# Patient Record
Sex: FEMALE | Race: WHITE
[De-identification: names, ages, dates, MRNs, and addresses within clinical notes are randomized per-mention and may not be internally consistent; named-entity substitution may affect disease eponyms.]

---

## 2019-01-19 ENCOUNTER — HOSPITAL ENCOUNTER (EMERGENCY)
Dept: HOSPITAL 14 - H.ER | Age: 64
Discharge: HOME | End: 2019-01-19
Payer: SELF-PAY

## 2019-01-19 VITALS — RESPIRATION RATE: 20 BRPM | HEART RATE: 50 BPM | DIASTOLIC BLOOD PRESSURE: 79 MMHG | SYSTOLIC BLOOD PRESSURE: 150 MMHG

## 2019-01-19 VITALS — TEMPERATURE: 98.1 F

## 2019-01-19 DIAGNOSIS — N39.0: Primary | ICD-10-CM

## 2019-01-19 LAB
BACTERIA #/AREA URNS HPF: (no result) /[HPF]
BILIRUB UR-MCNC: NEGATIVE MG/DL
COLOR UR: YELLOW
GLUCOSE UR STRIP-MCNC: (no result) MG/DL
LEUKOCYTE ESTERASE UR-ACNC: (no result) LEU/UL
PH UR STRIP: 7 [PH] (ref 5–8)
PROT UR STRIP-MCNC: 100 MG/DL
RBC # UR STRIP: (no result) /UL
SP GR UR STRIP: 1.02 (ref 1–1.03)
SQUAMOUS EPITHIAL: 1 /HPF (ref 0–5)
URINE CLARITY: (no result)
URINE HYALINE CAST: (no result) /HPF (ref 0–2)
UROBILINOGEN UR-MCNC: (no result) MG/DL (ref 0.2–1)

## 2019-01-19 NOTE — ED PDOC
HPI: Female  Pain


Time Seen by Provider: 19 12:13


Chief Complaint (Nursing): Female Genitourinary


Chief Complaint (Provider): Female Genitourinary


History Per: Patient


History/Exam Limitations: no limitations


Onset/Duration Of Symptoms: Days (x1)


Associated Symptoms: Urinary Symptoms (Dysuria), Other (Lower abdominal pain).  

denies: Nausea, Vomiting, Diarrhea


Additional Complaint(s): 





62 y/o female presents to ER for evaluation of pain with urination and lower 

abdominal pain onset 1 day ago. Patient denies any nausea, vomiting, diarrhea, 

taking medicine for pain or history of UTI or kidney problems.





PMD: None provided





Past Medical History


Reviewed: Historical Data, Nursing Documentation


Vital Signs: 





                                Last Vital Signs











Temp  97.9 F   19 11:58


 


Pulse  59 L  19 11:58


 


Resp  18   19 11:58


 


BP  150/69   19 11:58


 


Pulse Ox  98   19 11:58














- Medical History


PMH: Anxiety, Gall Bladder Disease, HTN





- Surgical History


Surgical History: Cholecystectomy





- Family History


Family History: States: Unknown Family Hx





- Social History


Current smoker - smoking cessation education provided: Yes


Alcohol: Social


Drugs: Denies





- Home Medications


Home Medications: 


                                Ambulatory Orders











 Medication  Instructions  Recorded


 


Nitrofurantoin Macrocrystals 100 mg PO BID #9 cap 19





[Macrobid]  


 


Phenazopyridine HCl [Pyridium] 100 mg PO TID #6 tablet 19














- Allergies


Allergies/Adverse Reactions: 


                                    Allergies











Allergy/AdvReac Type Severity Reaction Status Date / Time


 


No Known Allergies Allergy   Verified 19 12:19














Review of Systems


ROS Statement: Except As Marked, All Systems Reviewed And Found Negative


Gastrointestinal: Positive for: Abdominal Pain (lower).  Negative for: Nausea, 

Vomiting, Diarrhea


Genitourinary Female: Positive for: Dysuria





Physical Exam





- Reviewed


Nursing Documentation Reviewed: Yes


Vital Signs Reviewed: Yes





- Physical Exam


Appears: Positive for: Non-toxic, No Acute Distress


Head Exam: Positive for: ATRAUMATIC, NORMOCEPHALIC


Skin: Positive for: Normal Color, Warm, Dry


Neck: Positive for: Normal, Painless ROM, Supple


Cardiovascular/Chest: Positive for: Regular Rate, Rhythm.  Negative for: Murmur


Respiratory: Positive for: Normal Breath Sounds.  Negative for: Respiratory 

Distress


Gastrointestinal/Abdominal: Positive for: Tenderness (Suprapubic).  Negative 

for: Guarding, Rebound


Extremity: Positive for: Normal ROM.  Negative for: Pedal Edema, Deformity


Neurologic/Psych: Positive for: Alert, Oriented (x3)





- ECG


O2 Sat by Pulse Oximetry: 98 (RA)


Pulse Ox Interpretation: Normal





Medical Decision Making


Medical Decision Makin


MDM: impression is UTI 


--UA


--Will consider blood work and imaging if UA is normal


 

--------------------------------------------------------------------------------


---------------


Scribe Attestation: 


Documented by Elvira Jimenes, acting as a scribe for Gale Hernandez MD.





Provider Scribe Attestation:


All medical record entries made by the Scribe were at my direction and 

personally dictated by me. I have reviewed the chart and agree that the record 

accurately reflects my personal performance of the history, physical exam, 

medical decision making, and the department course for this patient. I have also

 personally directed, reviewed, and agree with the discharge instructions and 

disposition.





Disposition





- Clinical Impression


Clinical Impression: 


 Urinary tract infection








- Disposition


Disposition: Routine/Home


Disposition Time: 14:20


Condition: IMPROVED


Additional Instructions: 


Take medications as prescribed.  Follow up with primary medical doctor in 3 to 5

 days.  Return to the emergency department if symptoms worsen or of new symptoms

 develop including fever, back pain, nausea, vomiting etc.


Prescriptions: 


Nitrofurantoin Macrocrystals [Macrobid] 100 mg PO BID #9 cap


Phenazopyridine HCl [Pyridium] 100 mg PO TID #6 tablet


Instructions:  Urinary Tract Infection, Adult (DC)


Forms:  [employer] ([place]ian)


Print Language: Japanese

## 2019-01-21 VITALS — OXYGEN SATURATION: 98 %
